# Patient Record
Sex: MALE | Race: WHITE
[De-identification: names, ages, dates, MRNs, and addresses within clinical notes are randomized per-mention and may not be internally consistent; named-entity substitution may affect disease eponyms.]

---

## 2018-10-08 ENCOUNTER — HOSPITAL ENCOUNTER (EMERGENCY)
Dept: HOSPITAL 96 - M.ERS | Age: 68
Discharge: HOME | End: 2018-10-08
Payer: MEDICARE

## 2018-10-08 VITALS — WEIGHT: 232.01 LBS | BODY MASS INDEX: 33.22 KG/M2 | HEIGHT: 70 IN

## 2018-10-08 VITALS — SYSTOLIC BLOOD PRESSURE: 128 MMHG | DIASTOLIC BLOOD PRESSURE: 83 MMHG

## 2018-10-08 DIAGNOSIS — I10: ICD-10-CM

## 2018-10-08 DIAGNOSIS — Z88.2: ICD-10-CM

## 2018-10-08 DIAGNOSIS — R00.0: Primary | ICD-10-CM

## 2018-10-08 DIAGNOSIS — E78.5: ICD-10-CM

## 2018-10-08 LAB
ABSOLUTE BASOPHILS: 0.1 THOU/UL (ref 0–0.2)
ABSOLUTE EOSINOPHILS: 0 THOU/UL (ref 0–0.7)
ABSOLUTE MONOCYTES: 1 THOU/UL (ref 0–1.2)
ALBUMIN SERPL-MCNC: 4.2 G/DL (ref 3.4–5)
ALP SERPL-CCNC: 51 U/L (ref 46–116)
ALT SERPL-CCNC: 30 U/L (ref 30–65)
ANION GAP SERPL CALC-SCNC: 10 MMOL/L (ref 7–16)
APTT BLD: 28.2 SECONDS (ref 25–31.3)
AST SERPL-CCNC: 22 U/L (ref 15–37)
BASOPHILS NFR BLD AUTO: 0.8 %
BILIRUB SERPL-MCNC: 0.4 MG/DL
BUN SERPL-MCNC: 13 MG/DL (ref 7–18)
CALCIUM SERPL-MCNC: 9.4 MG/DL (ref 8.5–10.1)
CHLORIDE SERPL-SCNC: 100 MMOL/L (ref 98–107)
CK-MB MASS: 1.8 NG/ML
CO2 SERPL-SCNC: 28 MMOL/L (ref 21–32)
CREAT SERPL-MCNC: 1 MG/DL (ref 0.6–1.3)
EOSINOPHIL NFR BLD: 0.7 %
GLUCOSE SERPL-MCNC: 118 MG/DL (ref 70–99)
GRANULOCYTES NFR BLD MANUAL: 62.3 %
HCT VFR BLD CALC: 49.8 % (ref 42–52)
HGB BLD-MCNC: 16.7 GM/DL (ref 14–18)
INR PPP: 1.1
LIPASE: 162 U/L (ref 73–393)
LYMPHOCYTES # BLD: 1.4 THOU/UL (ref 0.8–5.3)
LYMPHOCYTES NFR BLD AUTO: 20.9 %
MAGNESIUM SERPL-MCNC: 1.8 MG/DL (ref 1.8–2.4)
MCH RBC QN AUTO: 31.2 PG (ref 26–34)
MCHC RBC AUTO-ENTMCNC: 33.5 G/DL (ref 28–37)
MCV RBC: 93.1 FL (ref 80–100)
MONOCYTES NFR BLD: 15.3 %
MPV: 7.9 FL. (ref 7.2–11.1)
NEUTROPHILS # BLD: 4.1 THOU/UL (ref 1.6–8.1)
NT-PRO BRAIN NAT PEPTIDE: 15 PG/ML (ref ?–300)
NUCLEATED RBCS: 0 /100WBC
PLATELET COUNT*: 193 THOU/UL (ref 150–400)
POTASSIUM SERPL-SCNC: 3.8 MMOL/L (ref 3.5–5.1)
PROT SERPL-MCNC: 8.5 G/DL (ref 6.4–8.2)
PROTHROMBIN TIME: 10.8 SECONDS (ref 9.2–11.5)
RBC # BLD AUTO: 5.34 MIL/UL (ref 4.5–6)
RDW-CV: 12.7 % (ref 10.5–14.5)
SODIUM SERPL-SCNC: 138 MMOL/L (ref 136–145)
TROPONIN-I LEVEL: <0.06 NG/ML (ref ?–0.06)
WBC # BLD AUTO: 6.6 THOU/UL (ref 4–11)

## 2018-10-08 NOTE — EKG
[place] [address]
Phone:  [phone]                     ELECTROCARDIOGRAM REPORT      
_______________________________________________________________________________
 
Name:       CHARISMA VARGAS JR          [id number]      Account #:      [id number]  
Admission:  10/08/18     Attend Phys:                         
Discharge:  10/08/18     Date of Birth:  [birth year]  
         Report #: [id number]
    [id number]
_______________________________________________________________________________
THIS REPORT FOR:  //name//                      
 
                         [facility] ED
                                       
Test Date:    2018-10-08               Test Time:    09:53:11
Pat Name:     CHARISMA VARGAS         Department:   
Patient ID:   [id number]            Room:          
Gender:                               Technician:   JEROD DOTSON
:          1950               Requested By: Ramsey Matthews
Order Number: [id number] MD:   Louie Flores
                                 Measurements
Intervals                              Axis          
Rate:         111                      P:            61
WA:           120                      QRS:          74
QRSD:         96                       T:            -28
QT:           312                                    
QTc:          424                                    
                           Interpretive Statements
Sinus tachycardia
Multiple ventricular premature complexes
Left atrial enlargement
Borderline repolarization abnormality
No previous ECG available for comparison
 
Electronically Signed On 10-8-2018 14:01:28 CDT by Louie Flores
[email or web address]
 
 
 
 
 
 
 
 
 
 
 
 
 
 
 
 
 
  <ELECTRONICALLY SIGNED>
                                           By: Louie Flores MD, [facility]      
  10/08/18     1401
D: 10/08/18 0953   _____________________________________
T: 10/08/18 0953   Louie Flores MD, FACC        /EPI

## 2021-05-06 ENCOUNTER — HOSPITAL ENCOUNTER (EMERGENCY)
Dept: HOSPITAL 96 - M.ERS | Age: 71
Discharge: HOME | End: 2021-05-06
Payer: MEDICARE

## 2021-05-06 VITALS — WEIGHT: 230.01 LBS | HEIGHT: 69 IN | BODY MASS INDEX: 34.07 KG/M2

## 2021-05-06 VITALS — DIASTOLIC BLOOD PRESSURE: 92 MMHG | SYSTOLIC BLOOD PRESSURE: 180 MMHG

## 2021-05-06 DIAGNOSIS — Z79.899: ICD-10-CM
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
## 2021-05-06 DIAGNOSIS — Z88.2: ICD-10-CM

## 2021-05-06 DIAGNOSIS — I10: ICD-10-CM
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
## 2021-05-06 DIAGNOSIS — S86.912A: Primary | ICD-10-CM
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
## 2021-05-06 DIAGNOSIS — E78.5: ICD-10-CM

## 2021-05-21 ENCOUNTER — HOSPITAL ENCOUNTER (OUTPATIENT)
Dept: HOSPITAL 96 - M.MRI | Age: 71
End: 2021-05-21
Attending: FAMILY MEDICINE
Payer: MEDICARE

## 2021-05-21 DIAGNOSIS — S83.241A: Primary | ICD-10-CM
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
## 2021-05-21 DIAGNOSIS — S83.411A: ICD-10-CM
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
